# Patient Record
Sex: MALE | Race: WHITE | NOT HISPANIC OR LATINO | Employment: FULL TIME | URBAN - METROPOLITAN AREA
[De-identification: names, ages, dates, MRNs, and addresses within clinical notes are randomized per-mention and may not be internally consistent; named-entity substitution may affect disease eponyms.]

---

## 2021-07-09 ENCOUNTER — HOSPITAL ENCOUNTER (EMERGENCY)
Facility: HOSPITAL | Age: 61
Discharge: HOME | End: 2021-07-09
Attending: EMERGENCY MEDICINE | Admitting: EMERGENCY MEDICINE

## 2021-07-09 VITALS
OXYGEN SATURATION: 92 % | BODY MASS INDEX: 30.87 KG/M2 | RESPIRATION RATE: 18 BRPM | WEIGHT: 215.61 LBS | SYSTOLIC BLOOD PRESSURE: 135 MMHG | DIASTOLIC BLOOD PRESSURE: 80 MMHG | HEIGHT: 70 IN | HEART RATE: 105 BPM | TEMPERATURE: 100 F

## 2021-07-09 DIAGNOSIS — N12 PYELONEPHRITIS: Primary | ICD-10-CM

## 2021-07-09 LAB
ALBUMIN SERPL-MCNC: 4.4 G/DL (ref 3.5–5.2)
ALBUMIN/GLOB SERPL: 1.5 G/DL
ALP SERPL-CCNC: 70 U/L (ref 39–117)
ALT SERPL W P-5'-P-CCNC: 29 U/L (ref 1–41)
ANION GAP SERPL CALCULATED.3IONS-SCNC: 12 MMOL/L (ref 5–15)
AST SERPL-CCNC: 21 U/L (ref 1–40)
BACTERIA UR QL AUTO: ABNORMAL /HPF
BASOPHILS # BLD AUTO: 0.05 10*3/MM3 (ref 0–0.2)
BASOPHILS NFR BLD AUTO: 0.4 % (ref 0–1.5)
BILIRUB SERPL-MCNC: 0.7 MG/DL (ref 0–1.2)
BILIRUB UR QL STRIP: NEGATIVE
BUN SERPL-MCNC: 16 MG/DL (ref 8–23)
BUN/CREAT SERPL: 12.7 (ref 7–25)
CALCIUM SPEC-SCNC: 9.3 MG/DL (ref 8.6–10.5)
CHLORIDE SERPL-SCNC: 99 MMOL/L (ref 98–107)
CLARITY UR: ABNORMAL
CO2 SERPL-SCNC: 26 MMOL/L (ref 22–29)
COLOR UR: YELLOW
CREAT SERPL-MCNC: 1.26 MG/DL (ref 0.76–1.27)
DEPRECATED RDW RBC AUTO: 46.4 FL (ref 37–54)
EOSINOPHIL # BLD AUTO: 0.03 10*3/MM3 (ref 0–0.4)
EOSINOPHIL NFR BLD AUTO: 0.3 % (ref 0.3–6.2)
ERYTHROCYTE [DISTWIDTH] IN BLOOD BY AUTOMATED COUNT: 13.9 % (ref 12.3–15.4)
GFR SERPL CREATININE-BSD FRML MDRD: 58 ML/MIN/1.73
GLOBULIN UR ELPH-MCNC: 3 GM/DL
GLUCOSE SERPL-MCNC: 103 MG/DL (ref 65–99)
GLUCOSE UR STRIP-MCNC: NEGATIVE MG/DL
HCT VFR BLD AUTO: 46.5 % (ref 37.5–51)
HGB BLD-MCNC: 15.6 G/DL (ref 13–17.7)
HGB UR QL STRIP.AUTO: ABNORMAL
HOLD SPECIMEN: NORMAL
HOLD SPECIMEN: NORMAL
HYALINE CASTS UR QL AUTO: ABNORMAL /LPF
IMM GRANULOCYTES # BLD AUTO: 0.04 10*3/MM3 (ref 0–0.05)
IMM GRANULOCYTES NFR BLD AUTO: 0.3 % (ref 0–0.5)
KETONES UR QL STRIP: NEGATIVE
LEUKOCYTE ESTERASE UR QL STRIP.AUTO: ABNORMAL
LIPASE SERPL-CCNC: 32 U/L (ref 13–60)
LYMPHOCYTES # BLD AUTO: 1.06 10*3/MM3 (ref 0.7–3.1)
LYMPHOCYTES NFR BLD AUTO: 8.9 % (ref 19.6–45.3)
MCH RBC QN AUTO: 30.4 PG (ref 26.6–33)
MCHC RBC AUTO-ENTMCNC: 33.5 G/DL (ref 31.5–35.7)
MCV RBC AUTO: 90.6 FL (ref 79–97)
MONOCYTES # BLD AUTO: 1.21 10*3/MM3 (ref 0.1–0.9)
MONOCYTES NFR BLD AUTO: 10.1 % (ref 5–12)
NEUTROPHILS NFR BLD AUTO: 80 % (ref 42.7–76)
NEUTROPHILS NFR BLD AUTO: 9.54 10*3/MM3 (ref 1.7–7)
NITRITE UR QL STRIP: POSITIVE
NRBC BLD AUTO-RTO: 0 /100 WBC (ref 0–0.2)
PH UR STRIP.AUTO: 7 [PH] (ref 5–8)
PLATELET # BLD AUTO: 196 10*3/MM3 (ref 140–450)
PMV BLD AUTO: 10.4 FL (ref 6–12)
POTASSIUM SERPL-SCNC: 4 MMOL/L (ref 3.5–5.2)
PROT SERPL-MCNC: 7.4 G/DL (ref 6–8.5)
PROT UR QL STRIP: ABNORMAL
RBC # BLD AUTO: 5.13 10*6/MM3 (ref 4.14–5.8)
RBC # UR: ABNORMAL /HPF
REF LAB TEST METHOD: ABNORMAL
SODIUM SERPL-SCNC: 137 MMOL/L (ref 136–145)
SP GR UR STRIP: 1.01 (ref 1–1.03)
SQUAMOUS #/AREA URNS HPF: ABNORMAL /HPF
UROBILINOGEN UR QL STRIP: ABNORMAL
WBC # BLD AUTO: 11.93 10*3/MM3 (ref 3.4–10.8)
WBC UR QL AUTO: ABNORMAL /HPF
WHOLE BLOOD HOLD SPECIMEN: NORMAL

## 2021-07-09 PROCEDURE — 99283 EMERGENCY DEPT VISIT LOW MDM: CPT

## 2021-07-09 PROCEDURE — 96374 THER/PROPH/DIAG INJ IV PUSH: CPT

## 2021-07-09 PROCEDURE — 85025 COMPLETE CBC W/AUTO DIFF WBC: CPT | Performed by: EMERGENCY MEDICINE

## 2021-07-09 PROCEDURE — 87186 SC STD MICRODIL/AGAR DIL: CPT | Performed by: EMERGENCY MEDICINE

## 2021-07-09 PROCEDURE — 80053 COMPREHEN METABOLIC PANEL: CPT

## 2021-07-09 PROCEDURE — 81001 URINALYSIS AUTO W/SCOPE: CPT | Performed by: EMERGENCY MEDICINE

## 2021-07-09 PROCEDURE — 25010000002 CEFTRIAXONE PER 250 MG: Performed by: EMERGENCY MEDICINE

## 2021-07-09 PROCEDURE — 87077 CULTURE AEROBIC IDENTIFY: CPT | Performed by: EMERGENCY MEDICINE

## 2021-07-09 PROCEDURE — 83690 ASSAY OF LIPASE: CPT

## 2021-07-09 PROCEDURE — 87086 URINE CULTURE/COLONY COUNT: CPT | Performed by: EMERGENCY MEDICINE

## 2021-07-09 RX ORDER — SODIUM CHLORIDE 0.9 % (FLUSH) 0.9 %
10 SYRINGE (ML) INJECTION AS NEEDED
Status: DISCONTINUED | OUTPATIENT
Start: 2021-07-09 | End: 2021-07-09 | Stop reason: HOSPADM

## 2021-07-09 RX ORDER — CIPROFLOXACIN 500 MG/1
500 TABLET, FILM COATED ORAL 2 TIMES DAILY
Qty: 14 TABLET | Refills: 0 | Status: SHIPPED | OUTPATIENT
Start: 2021-07-09 | End: 2021-07-16

## 2021-07-09 RX ORDER — ONDANSETRON 2 MG/ML
4 INJECTION INTRAMUSCULAR; INTRAVENOUS ONCE
Status: DISCONTINUED | OUTPATIENT
Start: 2021-07-09 | End: 2021-07-09 | Stop reason: HOSPADM

## 2021-07-09 RX ORDER — SILDENAFIL 50 MG/1
50 TABLET, FILM COATED ORAL AS NEEDED
COMMUNITY
Start: 2020-10-26 | End: 2021-10-26

## 2021-07-09 RX ORDER — ONDANSETRON 4 MG/1
4 TABLET, ORALLY DISINTEGRATING ORAL EVERY 8 HOURS PRN
Qty: 9 TABLET | Refills: 0 | Status: SHIPPED | OUTPATIENT
Start: 2021-07-09

## 2021-07-09 RX ADMIN — SODIUM CHLORIDE 1000 ML: 9 INJECTION, SOLUTION INTRAVENOUS at 18:45

## 2021-07-09 RX ADMIN — CEFTRIAXONE SODIUM 1 G: 1 INJECTION, POWDER, FOR SOLUTION INTRAMUSCULAR; INTRAVENOUS at 20:47

## 2021-07-09 RX ADMIN — SODIUM CHLORIDE, PRESERVATIVE FREE 10 ML: 5 INJECTION INTRAVENOUS at 20:47

## 2021-07-09 NOTE — ED PROVIDER NOTES
Patient: Bharathi Weinstein   YOB: 1960      Date: 07/09/21  __________________________________________________________________________________    Time: 18:48 EDT, 7/9/2021 (Pt was seen in room upon arrival)  Arrived by: Private vehicle  History provided by: patent and spouse  History is limited by: N/A    Chief Complaint: FATIGUE    History of Present Illness:  Patient is a 61 y.o. year old male that presents to the emergency department with Fatigue   Symptoms started a week ago and are present during today's ED Visit. The timing of the symptoms are constant. The severity of the symptoms are moderate when described by the patient.     Pt reports that for the past week he has felt weak and has developed muscle aches.      Pt report that he has had both COVID shots and the had negative COVID tests.     Additional information about the patient description of these symptoms are listed below.         Weakness - Generalized  Severity:  Moderate  Onset quality:  Gradual  Timing:  Constant  Progression:  Worsening  Relieved by:  Nothing  Worsened by:  Nothing  Associated symptoms: fever    Associated symptoms: no abdominal pain, no arthralgias, no ataxia, no chest pain, no cough, no diarrhea, no difficulty walking, no dizziness, no dysuria, no falls, no frequency, no headaches, no nausea, no near-syncope, no sensory-motor deficit, no shortness of breath, no stroke symptoms, no syncope, no vision change and no vomiting    Fever:     Duration:  1 week    Timing:  Constant    Temp source:  Oral          Similar Symptoms Previously: no  Recently seen: no      Patient Care Team  Primary Care Provider: Provider, No Known    Past Medical History:     Allergies   Allergen Reactions   • Penicillins Hives     History reviewed. No pertinent past medical history.  History reviewed. No pertinent surgical history.  History reviewed. No pertinent family history.    Home Medications:  Prior to Admission medications    Medication  "Sig Start Date End Date Taking? Authorizing Provider   sildenafil (VIAGRA) 50 MG tablet Take 50 mg by mouth As Needed. 10/26/20 10/26/21 Yes Provider, Emily, MD        Social History:   PT  reports that he quit smoking about 25 years ago. His smoking use included cigarettes. He smoked 1.00 pack per day. He has never used smokeless tobacco. He reports current alcohol use of about 12.0 standard drinks of alcohol per week. He reports that he does not use drugs.    Record Review:  I have reviewed the patient's records in [facility].     Review of Systems  Review of Systems   Constitutional: Positive for fever. Negative for chills and fatigue.   HENT: Negative for congestion, ear pain, mouth sores, sinus pain and sore throat.    Eyes: Negative for pain and discharge.   Respiratory: Negative for cough and shortness of breath.    Cardiovascular: Negative for chest pain, syncope and near-syncope.   Gastrointestinal: Negative for abdominal pain, constipation, diarrhea, nausea and vomiting.   Genitourinary: Negative for difficulty urinating, dysuria and frequency.   Musculoskeletal: Negative for arthralgias, back pain, falls and neck pain.   Skin: Negative for rash.   Neurological: Negative for dizziness, weakness and headaches.   All other systems reviewed and are negative.       Physical Exam  /80   Pulse 105   Temp 100 °F (37.8 °C) (Oral)   Resp 18   Ht 177.8 cm (70\")   Wt 97.8 kg (215 lb 9.8 oz)   SpO2 92%   BMI 30.94 kg/m²     Physical Exam  Vitals and nursing note reviewed.   Constitutional:       General: He is not in acute distress.     Appearance: Normal appearance.      Comments: Pt appears fatigue    HENT:      Head: Normocephalic and atraumatic.      Nose: Nose normal.      Mouth/Throat:      Mouth: Mucous membranes are moist.      Pharynx: Oropharynx is clear. No posterior oropharyngeal erythema.   Eyes:      Extraocular Movements: Extraocular movements intact.      Pupils: Pupils are equal, round, " "and reactive to light.   Cardiovascular:      Rate and Rhythm: Regular rhythm. Tachycardia present.      Pulses: Normal pulses.      Heart sounds: Normal heart sounds. No murmur heard.     Pulmonary:      Effort: No respiratory distress.      Breath sounds: Normal breath sounds. No stridor. No wheezing, rhonchi or rales.   Abdominal:      General: Bowel sounds are normal. There is no distension.      Palpations: Abdomen is soft. There is no mass.      Tenderness: There is no abdominal tenderness.   Musculoskeletal:         General: No swelling or tenderness. Normal range of motion.      Cervical back: Normal range of motion and neck supple. No tenderness.      Right lower leg: No edema.      Left lower leg: No edema.   Skin:     General: Skin is warm.      Coloration: Skin is not pale.      Findings: No erythema or rash.   Neurological:      General: No focal deficit present.      Mental Status: He is alert and oriented to person, place, and time. Mental status is at baseline.      Sensory: No sensory deficit.      Motor: No weakness.   Psychiatric:         Mood and Affect: Mood normal.         Behavior: Behavior normal.         Thought Content: Thought content normal.         Judgment: Judgment normal.                  ED Course  /80   Pulse 105   Temp 100 °F (37.8 °C) (Oral)   Resp 18   Ht 177.8 cm (70\")   Wt 97.8 kg (215 lb 9.8 oz)   SpO2 92%   BMI 30.94 kg/m²   Results for orders placed or performed during the hospital encounter of 07/09/21   Comprehensive Metabolic Panel    Specimen: Arm, Right; Blood   Result Value Ref Range    Glucose 103 (H) 65 - 99 mg/dL    BUN 16 8 - 23 mg/dL    Creatinine 1.26 0.76 - 1.27 mg/dL    Sodium 137 136 - 145 mmol/L    Potassium 4.0 3.5 - 5.2 mmol/L    Chloride 99 98 - 107 mmol/L    CO2 26.0 22.0 - 29.0 mmol/L    Calcium 9.3 8.6 - 10.5 mg/dL    Total Protein 7.4 6.0 - 8.5 g/dL    Albumin 4.40 3.50 - 5.20 g/dL    ALT (SGPT) 29 1 - 41 U/L    AST (SGOT) 21 1 - 40 U/L    " Alkaline Phosphatase 70 39 - 117 U/L    Total Bilirubin 0.7 0.0 - 1.2 mg/dL    eGFR Non African Amer 58 (L) >60 mL/min/1.73    Globulin 3.0 gm/dL    A/G Ratio 1.5 g/dL    BUN/Creatinine Ratio 12.7 7.0 - 25.0    Anion Gap 12.0 5.0 - 15.0 mmol/L   Lipase    Specimen: Arm, Right; Blood   Result Value Ref Range    Lipase 32 13 - 60 U/L   Urinalysis With Microscopic If Indicated (No Culture) - Urine, Clean Catch    Specimen: Urine, Clean Catch   Result Value Ref Range    Color, UA Yellow Yellow, Straw    Appearance, UA Cloudy (A) Clear    pH, UA 7.0 5.0 - 8.0    Specific Gravity, UA 1.013 1.005 - 1.030    Glucose, UA Negative Negative    Ketones, UA Negative Negative    Bilirubin, UA Negative Negative    Blood, UA Small (1+) (A) Negative    Protein, UA Trace (A) Negative    Leuk Esterase, UA Large (3+) (A) Negative    Nitrite, UA Positive (A) Negative    Urobilinogen, UA 1.0 E.U./dL 0.2 - 1.0 E.U./dL   CBC Auto Differential    Specimen: Arm, Right; Blood   Result Value Ref Range    WBC 11.93 (H) 3.40 - 10.80 10*3/mm3    RBC 5.13 4.14 - 5.80 10*6/mm3    Hemoglobin 15.6 13.0 - 17.7 g/dL    Hematocrit 46.5 37.5 - 51.0 %    MCV 90.6 79.0 - 97.0 fL    MCH 30.4 26.6 - 33.0 pg    MCHC 33.5 31.5 - 35.7 g/dL    RDW 13.9 12.3 - 15.4 %    RDW-SD 46.4 37.0 - 54.0 fl    MPV 10.4 6.0 - 12.0 fL    Platelets 196 140 - 450 10*3/mm3    Neutrophil % 80.0 (H) 42.7 - 76.0 %    Lymphocyte % 8.9 (L) 19.6 - 45.3 %    Monocyte % 10.1 5.0 - 12.0 %    Eosinophil % 0.3 0.3 - 6.2 %    Basophil % 0.4 0.0 - 1.5 %    Immature Grans % 0.3 0.0 - 0.5 %    Neutrophils, Absolute 9.54 (H) 1.70 - 7.00 10*3/mm3    Lymphocytes, Absolute 1.06 0.70 - 3.10 10*3/mm3    Monocytes, Absolute 1.21 (H) 0.10 - 0.90 10*3/mm3    Eosinophils, Absolute 0.03 0.00 - 0.40 10*3/mm3    Basophils, Absolute 0.05 0.00 - 0.20 10*3/mm3    Immature Grans, Absolute 0.04 0.00 - 0.05 10*3/mm3    nRBC 0.0 0.0 - 0.2 /100 WBC   Urinalysis, Microscopic Only - Urine, Clean Catch    Specimen:  Urine, Clean Catch   Result Value Ref Range    RBC, UA 3-5 (A) None Seen /HPF    WBC, UA Too Numerous to Count (A) None Seen /HPF    Bacteria, UA 4+ (A) None Seen /HPF    Squamous Epithelial Cells, UA 0-2 None Seen, 0-2 /HPF    Hyaline Casts, UA 0-2 None Seen /LPF    Methodology Automated Microscopy    Green Top (Gel)   Result Value Ref Range    Extra Tube Hold for add-ons.    Lavender Top   Result Value Ref Range    Extra Tube hold for add-on    Gold Top - SST   Result Value Ref Range    Extra Tube Hold for add-ons.      Medications   sodium chloride 0.9 % bolus 1,000 mL (0 mL Intravenous Stopped 7/9/21 2050)   cefTRIAXone (ROCEPHIN) in NS 1 gram/10ml IV PUSH syringe (1 g Intravenous Given 7/9/21 2047)     No results found.    Procedures/EKGs:  Procedures    Reevaluation:  Time: 23:46 EDT  Pt states that he was feeling a lot better.      Medical Decision Making:  MDM  Number of Diagnoses or Management Options     Amount and/or Complexity of Data Reviewed  Clinical lab tests: reviewed  Tests in the radiology section of CPT®: reviewed       This is a 61-year-old male who presents with a UTI and systemic symptoms consistent with pyelonephritis but not sepsis.  He was given IV fluids and Rocephin and will be discharged home on Cipro given his allergy profile.  He will follow up with his PCP and return to this emergency department should his symptoms worsen.  Doubt Covid, doubt influenza, doubt rhabdomyolysis, and doubt renal failure among other things in the differential diagnosis.  Final diagnoses:   Pyelonephritis        Disposition:  ED Disposition     ED Disposition Condition Comment    Discharge Stable           Documentation assistance provided by Héctor Sharif acting as scribe for No att. providers found. Information recorded by the scribe was done at my direction and has been verified and validated by me.        Héctor Sharif  07/09/21 1852       Héctor Sharif  07/09/21 1912       Kole  Woodruff  07/09/21 1953       Kole, Woodruff  07/09/21 2025       Kole, Woodruff  07/09/21 2049       Kole, Woodruff  07/09/21 2050       Kole, Woodruff  07/09/21 2053       Obed Avila DO  07/09/21 1214

## 2021-07-11 LAB — BACTERIA SPEC AEROBE CULT: ABNORMAL

## 2022-07-25 ENCOUNTER — LAB (OUTPATIENT)
Dept: LAB | Facility: HOSPITAL | Age: 62
End: 2022-07-25

## 2022-07-25 ENCOUNTER — TRANSCRIBE ORDERS (OUTPATIENT)
Dept: LAB | Facility: HOSPITAL | Age: 62
End: 2022-07-25

## 2022-07-25 DIAGNOSIS — Z01.818 PRE-OP TESTING: Primary | ICD-10-CM

## 2022-07-25 DIAGNOSIS — Z01.818 PRE-OP TESTING: ICD-10-CM

## 2022-07-25 LAB — SARS-COV-2 RNA PNL SPEC NAA+PROBE: NOT DETECTED

## 2022-07-25 PROCEDURE — U0004 COV-19 TEST NON-CDC HGH THRU: HCPCS
